# Patient Record
Sex: FEMALE | URBAN - METROPOLITAN AREA
[De-identification: names, ages, dates, MRNs, and addresses within clinical notes are randomized per-mention and may not be internally consistent; named-entity substitution may affect disease eponyms.]

---

## 2020-08-09 ENCOUNTER — NURSE TRIAGE (OUTPATIENT)
Dept: NURSING | Facility: CLINIC | Age: 20
End: 2020-08-09

## 2020-08-09 NOTE — TELEPHONE ENCOUNTER
Sanjay has a Left Earache that started earlier this week, maybe 4-5 days ago.    The pain will subside and increase throughout the day. She will feel a throbbing with the pain. She rates the pain 4-8/10    She has tried [facility] Earache drops for the last 2 days.  They have given some brief relief but the pain returns after a couple hours.   She has not tried acetaminophen or ibuprofen.    Per Protocol, advised to be seen/evaluated by a provider within 24 hours.  Care Advice reviewed.    COVID 19 Nurse Triage Plan/Patient Instructions    Please be aware that novel coronavirus (COVID-19) may be circulating in the community. If you develop symptoms such as fever, cough, or SOB or if you have concerns about the presence of another infection including coronavirus (COVID-19), please contact your health care provider or visit [email or web address].     Disposition/Instructions    Virtual Visit with provider recommended. Reference Visit Selection Guide.  In-Person Visit with provider recommended. Reference Visit Selection Guide.    Thank you for taking steps to prevent the spread of this virus.  o Limit your contact with others.  o Wear a simple mask to cover your cough.  o Wash your hands well and often.    Resources    [facility]: About COVID-19: [email or web address]    CDC: What to Do If You're Sick: www.cdc.gov/coronavirus/2019-ncov/about/steps-when-sick.html    CDC: Ending Home Isolation: www.cdc.gov/coronavirus/2019-ncov/hcp/disposition-in-home-patients.html     CDC: Caring for Someone: www.cdc.gov/coronavirus/2019-ncov/if-you-are-sick/care-for-someone.html     [facility]: Interim Guidance for Hospital Discharge to Home: www.health.[facility].[place]/diseases/coronavirus/hcp/hospdischarge.pdf    [facility] clinical trials (COVID-19 research studies): clinicalaffairs.[facility]/umn-clinical-trials     Below are the COVID-19 hotlines at the [place] Department of [facility] ([facility]). Interpreters are available.    o For health questions: Call 906-666-1655 or 1-816.492.8969 (7 a.m. to 7 p.m.)  o For questions about schools and childcare: Call 589-907-7799 or 1-261.830.5589 (7 a.m. to 7 p.m.)     Sierra Singh RN  [facility] Nurse Advisors      Additional Information    Negative: [1] Stiff neck (unable to touch chin to chest) AND [2] fever    Negative: [1] Bony area of skull behind the ear is pink or swollen AND [2] fever    Negative: Fever > 104 F (40 C)    Negative: Patient sounds very sick or weak to the triager    Negative: [1] SEVERE pain AND [2] not improved 2 hours after taking analgesic medication (e.g., ibuprofen or acetaminophen)    Negative: Walking is very unsteady    Negative: Sudden onset of ear pain after long - thin object was inserted into the ear canal (e.g., pencil, Q-tip)    Negative: Diabetes mellitus or weak immune system (e.g., HIV positive, cancer chemo, splenectomy, organ transplant, chronic steroids)    Negative: New blurred vision or vision changes    Negative: White, yellow, or green discharge    Negative: Bloody discharge or unexplained bleeding from ear canal    Earache  (Exceptions: brief ear pain of < 60 minutes duration, earache occurring during air travel    Protocols used: EARACHE-A-

## 2022-03-23 ENCOUNTER — NURSE TRIAGE (OUTPATIENT)
Dept: NURSING | Facility: CLINIC | Age: 22
End: 2022-03-23

## 2022-03-23 NOTE — TELEPHONE ENCOUNTER
Patient has been experiencing nausea, especially after eating lunch. This has been going on for a few weeks. The last couple days it is lasting a couple hours then will go away.  Patient is also experiencing some gas.  Patient denied vomiting but has had diarrhea x2.    Patient denies pregnancy, states is on her priod.  Patient is able to walk, talk with ease.  Patient denies fever or any severe abdominal pain.  Patient denies any yellow tint to her skin or eyes.  Patient is only taking Zyrtec and levothyroxine.    Care advise: continue to push fluids, make a diary of times you get nauseous what you ate and what you were doing.  Call clinic tomorrow and make an appointment to be seen in the next 2-3 days.    Patient plans to follow care advise.    Kelly Salazar RN   03/23/22 6:14 PM  [facility] Nurse Advisor          Reason for Disposition    Nausea lasts > 1 week    Additional Information    Negative: Shock suspected (e.g., cold/pale/clammy skin, too weak to stand, low BP, rapid pulse)    Negative: Sounds like a life-threatening emergency to the triager    Negative: [1] Nausea or vomiting AND [2] pregnancy < 20 weeks    Negative: Menstrual Period - Missed or Late (i.e., pregnancy suspected)    Negative: Heat exhaustion suspected (i.e., dehydration from heat exposure)    Negative: Motion sickness suspected (i.e., nausea with car, plane, boat, or train travel)    Negative: Anxiety or stress suspected (i.e., nausea with anxiety attacks or stressful situations)    Negative: Traumatic Brain Injury (TBI) suspected    Negative: Nausea (or Vomiting) in a cancer patient who is currently (or recently) receiving chemotherapy or radiation therapy, or cancer patient who has metastatic or end-stage cancer and is receiving palliative care    Negative: Vomiting occurs    Negative: Other symptom is present, see that guideline.  (e.g., chest pain, headache, dizziness, abdominal pain, colds, sore throat, etc.).     Negative: Unable to walk, or can only walk with assistance (e.g., requires support)    Negative: Difficulty breathing    Negative: [1] Insulin-dependent diabetes (Type I) AND [2] glucose > 400 mg/dl (22 mmol/l)    Negative: [1] Drinking very little AND [2] dehydration suspected (e.g., no urine > 12 hours, very dry mouth, very lightheaded)    Negative: Patient sounds very sick or weak to the triager    Negative: Fever > 104 F (40 C)    Negative: [1] Fever > 101 F (38.3 C) AND [2] age > 60    Negative: [1] Fever > 100.0 F (37.8 C) AND [2] bedridden (e.g., nursing home patient, CVA, chronic illness, recovering from surgery)    Negative: [1] Fever > 100.0 F (37.8 C) AND [2] diabetes mellitus or weak immune system (e.g., HIV positive, cancer chemo, splenectomy, organ transplant, chronic steroids)    Negative: Taking any of the following medications: digoxin (Lanoxin), lithium, theophylline, phenytoin (Dilantin)    Negative: Yellowish color of the skin or white of the eye (i.e., jaundice)    Negative: Fever present > 3 days (72 hours)    Negative: Receiving cancer chemotherapy medication    Negative: Taking prescription medication that could cause nausea (e.g., narcotics/opiates, antibiotics, OCPs, many others)    Protocols used: NAUSEA-A-AH